# Patient Record
Sex: MALE | Race: OTHER | URBAN - METROPOLITAN AREA
[De-identification: names, ages, dates, MRNs, and addresses within clinical notes are randomized per-mention and may not be internally consistent; named-entity substitution may affect disease eponyms.]

---

## 2020-07-25 ENCOUNTER — TELEPHONE ENCOUNTER (OUTPATIENT)
Dept: URBAN - METROPOLITAN AREA CLINIC 13 | Facility: CLINIC | Age: 35
End: 2020-07-25

## 2020-07-26 ENCOUNTER — TELEPHONE ENCOUNTER (OUTPATIENT)
Dept: URBAN - METROPOLITAN AREA CLINIC 13 | Facility: CLINIC | Age: 35
End: 2020-07-26

## 2025-01-14 ENCOUNTER — OFFICE VISIT (OUTPATIENT)
Dept: URBAN - METROPOLITAN AREA CLINIC 113 | Facility: CLINIC | Age: 40
End: 2025-01-14
Payer: COMMERCIAL

## 2025-01-14 ENCOUNTER — DASHBOARD ENCOUNTERS (OUTPATIENT)
Age: 40
End: 2025-01-14

## 2025-01-14 VITALS
WEIGHT: 218 LBS | HEART RATE: 84 BPM | HEIGHT: 69 IN | BODY MASS INDEX: 32.29 KG/M2 | SYSTOLIC BLOOD PRESSURE: 100 MMHG | DIASTOLIC BLOOD PRESSURE: 72 MMHG | TEMPERATURE: 97.3 F

## 2025-01-14 DIAGNOSIS — K21.9 GASTROESOPHAGEAL REFLUX DISEASE, UNSPECIFIED WHETHER ESOPHAGITIS PRESENT: ICD-10-CM

## 2025-01-14 DIAGNOSIS — K59.1 FUNCTIONAL DIARRHEA: ICD-10-CM

## 2025-01-14 DIAGNOSIS — K60.2 ANAL FISSURE: ICD-10-CM

## 2025-01-14 PROBLEM — 235595009: Status: ACTIVE | Noted: 2025-01-14

## 2025-01-14 PROCEDURE — 99203 OFFICE O/P NEW LOW 30 MIN: CPT

## 2025-01-14 PROCEDURE — 99243 OFF/OP CNSLTJ NEW/EST LOW 30: CPT

## 2025-01-14 RX ORDER — ESCITALOPRAM OXALATE 5 MG/1
1 TABLET TABLET, FILM COATED ORAL ONCE A DAY
Status: ACTIVE | COMMUNITY

## 2025-01-14 RX ORDER — OMEPRAZOLE 20 MG/1
1 CAPSULE 1/2 TO 1 HOUR BEFORE MORNING MEAL CAPSULE, DELAYED RELEASE ORAL
Status: ACTIVE | COMMUNITY

## 2025-01-14 NOTE — HPI-TODAY'S VISIT:
39 year old male is referred by Dr. Sindy Hennessy for GERD. A copy of today's visit will be forwarded to the referring provider.   Labs 12/9/2024: normal H/H, glucose 115, normal LFTs, cholesterol 239, triglycerides 219, normal TSH, HbA1c 6.1.   He is known to Dr. Tovar. He had a colonoscopy in 2007 for BRBPR, change in bowel habits and hem positive stool. This showed an anal fissure at 6 o'clock, otherwise normal. This was treated with topical therapy. He reported persistent diarrhea in 2010. Functional bowel disorder was suspected. He was recommended fiber and Levsin.  In the early part of last year, he was experiencing reflux. He was started on Omeprazole. This worked really well and then one day he had severe reflux and a "fatigue feeling" in his epigastrium. This is also a dull ache. It may intensify briefly and go away. His discomfort does not radiate. It is not always associated with food. It may be more prominent when waking up in the morning. Denies nausea or vomiting. Denies dysphagia or odynophagia. His heartburn is otherwise controlled currently.  He does take NSAIDs occasionally for headaches. This may be 2-3 times a week or not for several weeks at a time. He did take Excedrin excessively in the past but not for the last year. No family history of GI malignancy. He does still have intermittent diarrhea. He has intermittent rectal bleeding with an intense bowel movement. He might have rectal pain if he is straining.

## 2025-02-03 ENCOUNTER — TELEPHONE ENCOUNTER (OUTPATIENT)
Dept: URBAN - METROPOLITAN AREA CLINIC 113 | Facility: CLINIC | Age: 40
End: 2025-02-03

## 2025-02-04 ENCOUNTER — CLAIMS CREATED FROM THE CLAIM WINDOW (OUTPATIENT)
Dept: URBAN - METROPOLITAN AREA SURGERY CENTER 25 | Facility: SURGERY CENTER | Age: 40
End: 2025-02-04
Payer: COMMERCIAL

## 2025-02-04 DIAGNOSIS — R10.13 EPIGASTRIC DISCOMFORT: ICD-10-CM

## 2025-02-04 DIAGNOSIS — K21.9 GASTRO-ESOPHAGEAL REFLUX DISEASE WITHOUT ESOPHAGITIS: ICD-10-CM

## 2025-02-04 DIAGNOSIS — K21.9 GASTROESOPHAGEAL REFLUX DISEASE, UNSPECIFIED WHETHER ESOPHAGITIS PRESENT: ICD-10-CM

## 2025-02-04 PROCEDURE — 43235 EGD DIAGNOSTIC BRUSH WASH: CPT | Performed by: INTERNAL MEDICINE

## 2025-02-04 PROCEDURE — 00731 ANES UPR GI NDSC PX NOS: CPT | Performed by: NURSE ANESTHETIST, CERTIFIED REGISTERED

## 2025-02-04 RX ORDER — ESCITALOPRAM OXALATE 5 MG/1
1 TABLET TABLET, FILM COATED ORAL ONCE A DAY
Status: ACTIVE | COMMUNITY

## 2025-02-04 RX ORDER — OMEPRAZOLE 20 MG/1
1 CAPSULE 1/2 TO 1 HOUR BEFORE MORNING MEAL CAPSULE, DELAYED RELEASE ORAL
Status: ACTIVE | COMMUNITY

## 2025-02-26 ENCOUNTER — OFFICE VISIT (OUTPATIENT)
Dept: URBAN - METROPOLITAN AREA CLINIC 113 | Facility: CLINIC | Age: 40
End: 2025-02-26
Payer: COMMERCIAL

## 2025-02-26 VITALS
BODY MASS INDEX: 31.78 KG/M2 | RESPIRATION RATE: 18 BRPM | HEART RATE: 64 BPM | WEIGHT: 214.6 LBS | TEMPERATURE: 97.9 F | HEIGHT: 69 IN | DIASTOLIC BLOOD PRESSURE: 76 MMHG | SYSTOLIC BLOOD PRESSURE: 110 MMHG

## 2025-02-26 DIAGNOSIS — K21.9 GASTROESOPHAGEAL REFLUX DISEASE, UNSPECIFIED WHETHER ESOPHAGITIS PRESENT: ICD-10-CM

## 2025-02-26 DIAGNOSIS — K60.2 ANAL FISSURE: ICD-10-CM

## 2025-02-26 DIAGNOSIS — K59.1 FUNCTIONAL DIARRHEA: ICD-10-CM

## 2025-02-26 PROCEDURE — 99214 OFFICE O/P EST MOD 30 MIN: CPT

## 2025-02-26 RX ORDER — ESCITALOPRAM OXALATE 10 MG/1
1 TABLET TABLET, FILM COATED ORAL ONCE A DAY
Status: ACTIVE | COMMUNITY

## 2025-02-26 RX ORDER — OMEPRAZOLE 20 MG/1
1 CAPSULE 1/2 TO 1 HOUR BEFORE MORNING MEAL CAPSULE, DELAYED RELEASE ORAL
Status: ACTIVE | COMMUNITY

## 2025-02-26 RX ORDER — OMEPRAZOLE 40 MG/1
1 CAPSULE 30 MINUTES BEFORE MORNING MEAL CAPSULE, DELAYED RELEASE ORAL ONCE A DAY
Qty: 90 | Refills: 3 | OUTPATIENT
Start: 2025-02-26

## 2025-02-26 NOTE — HPI-TODAY'S VISIT:
39-year-old male presents for follow-up after EGD.  He was last seen on 1/14/2025.  He has a history of GERD which was previously well-controlled on omeprazole.  He recently experienced an exacerbation followed by persistent intermittent epigastric discomfort.  He does have a history of prior routine NSAID use and was therefore recommended an EGD.  Regarding his functional diarrhea he was recommended restarting a daily fiber supplement.  He has a history of anal fissure which was infrequently bothersome.  He was to call if he desired a prescription for nifedipine/lidocaine topical. EGD 2//25:Performed without difficulty.  Esophagus, stomach and duodenum were normal.  No specimens were collected.  He now wonders if his pain is musculoskeletal. He reiterates it's not so much of a pain. It only occurs in the morning, and he wonders if his sleeping position is the cause. He denies any associated GI symptoms or triggers. His bowels are improved on fiber. He states his insurance no longer covers the Omeprazole 20 mg. he is requesting a rx for Omeprazole 40 mg to take every other day. He does have questions regarding layngopharyngeal reflux. He develops phlegm after eating.